# Patient Record
Sex: FEMALE | Race: WHITE | Employment: FULL TIME | URBAN - METROPOLITAN AREA
[De-identification: names, ages, dates, MRNs, and addresses within clinical notes are randomized per-mention and may not be internally consistent; named-entity substitution may affect disease eponyms.]

---

## 2020-10-30 ENCOUNTER — APPOINTMENT (OUTPATIENT)
Dept: GENERAL RADIOLOGY | Age: 65
End: 2020-10-30
Payer: COMMERCIAL

## 2020-10-30 ENCOUNTER — HOSPITAL ENCOUNTER (EMERGENCY)
Age: 65
Discharge: HOME | End: 2020-10-30
Attending: EMERGENCY MEDICINE
Payer: COMMERCIAL

## 2020-10-30 VITALS
OXYGEN SATURATION: 98 % | WEIGHT: 133 LBS | HEIGHT: 59 IN | TEMPERATURE: 98.3 F | DIASTOLIC BLOOD PRESSURE: 81 MMHG | HEART RATE: 83 BPM | RESPIRATION RATE: 16 BRPM | SYSTOLIC BLOOD PRESSURE: 138 MMHG | BODY MASS INDEX: 26.81 KG/M2

## 2020-10-30 PROCEDURE — 93971 EXTREMITY STUDY: CPT

## 2020-10-30 PROCEDURE — 99283 EMERGENCY DEPT VISIT LOW MDM: CPT

## 2020-10-30 PROCEDURE — 73630 X-RAY EXAM OF FOOT: CPT

## 2020-10-30 ASSESSMENT — PAIN SCALES - GENERAL: PAINLEVEL_OUTOF10: 3

## 2020-11-03 ASSESSMENT — ENCOUNTER SYMPTOMS
COLOR CHANGE: 1
COUGH: 0
SHORTNESS OF BREATH: 0

## 2020-11-03 NOTE — ED PROVIDER NOTES
Neurological: Negative for weakness and numbness. PHYSICAL EXAM   (up to 7 for level 4, 8 or more for level 5)      Initial Vitals   ED Triage Vitals [10/30/20 2055]   BP Temp Temp Source Pulse Resp SpO2 Height Weight   138/81 98.3 °F (36.8 °C) Oral 83 16 98 % 4' 11\" (1.499 m) 133 lb (60.3 kg)       Physical Exam  Vitals signs and nursing note reviewed. Constitutional:       General: She is not in acute distress. Appearance: Normal appearance. HENT:      Mouth/Throat:      Comments: Patient is currently wearing a facial mask. Given that patient is not complaining of sore throat or difficulty swallowing, mask was left in place to decrease risk of aersolization of particles in the setting of current CoVID-19 pandemic    Cardiovascular:      Pulses: Normal pulses. Musculoskeletal:      Comments: Patient right foot with ecchymosis lateral portion exposed underneath the lateral malleolus. No ecchymosis in the posterior venous system on the right leg. However right calf slightly enlarged and reddened left calf numbness. No posterior popliteal tenderness. Normal range of motion. Skin:     General: Skin is warm. Capillary Refill: Capillary refill takes less than 2 seconds. Findings: Bruising present. Neurological:      General: No focal deficit present. Mental Status: She is alert and oriented to person, place, and time. Sensory: No sensory deficit. Motor: No weakness. DIFFERENTIAL DIAGNOSIS/IMPRESSION     DDX: MSK injury and DVT. Impression: 60 y.o. female who presents with bruising of the right leg. She has asymptomatic ecchymosis along the direction of the foot that is underneath the lateral malleolus. Patient had over 10 hours from [place]. No injuries to the foot. Normal range of motion. Normal strength sensation. Normal pulses. Concern for DVT given the area of distal asymmetry.   And patient reporting earlier she had noticed a bruise noted on the posterior calf. This is a special concern in the setting of the patient not having any specific trauma. Clinical suspicion for blood clot is high. Will get DVT study and xray. DIAGNOSTIC RESULTS     EKG: All EKG's are interpreted by the Emergency Department Physician who either signs or Co-signs this chart in the absence of a cardiologist.    Not clinically indicated at this time. LABS: Labswere reviewed by me and abnormal results are displayed above     Labs Reviewed - No data to display    RADIOLOGY: All plain film, CT, MRI, and formal ultrasound images (except ED bedside ultrasound) are read by the radiologist, see reports below, unless otherwise noted in ED Course, MDM or here. Xr Foot Right (min 3 Views)    Result Date: 10/30/2020  EXAMINATION: THREE XRAY VIEWS OF THE RIGHT FOOT 10/30/2020 6:02 pm COMPARISON: None. HISTORY: ORDERING SYSTEM PROVIDED HISTORY: foot pain and hematoma TECHNOLOGIST PROVIDED HISTORY: foot pain and hematoma Reason for Exam: Pain and dorsal bruising Acuity: Acute Type of Exam: Initial Additional signs and symptoms: No known injury FINDINGS: There is equivocal periosteal reaction seen along the 2nd metatarsal, especially along its medial aspect. No acute traumatic abnormalities identified. The Lisfranc joint is congruent on these nonweightbearing images. The subtalar joint is unremarkable, with maintenance of the anterior process of the calcaneus. Mild dorsal soft tissue swelling is noted. No soft tissue gas or radiopaque foreign body. Equivocal periosteal reaction involving the 2nd metatarsal, which raise the possibility of a stress response. Dorsal soft tissue swelling. No other acute abnormality detected. If symptoms persist, consider further evaluation with nonemergent MRI.      Vl Lower Extremity Venous Right    Result Date: 10/30/2020    [facility]  Vascular Lower Extremities DVT Study Procedure   Patient Name    [facility]     Date of Study 10/30/2020                  Fritz Hermosillo   Date of Birth   1955   Gender                  Female   Age             60 year(s)   Race                       Room Number     [facility]   Corporate ID #  [id number]   Patient Acct #  [de-identified]   MR #            [id number]       Sonographer             Yovany Garcia   Accession #     [id number]   Interpreting Physician  Enrique Morel   Referring Nurse              Referring Physician     Kady Monsalve MD  Practitioner  Procedure Type of Study:   Veins: Lower Extremities DVT Study, Venous Scan Lower Right. Patient Status:ER. Technical Quality:Adequate visualization. Comments: Indications: Bruising and leg pain in after driving. Patient has a bruise on top of foot. Patient informed me that she was concerned about the bruise on the top of her foot and the pain on the bottom of her foot. Patient said she had no other pain other than the bottom of her foot. Conclusions   Summary   No evidence of superficial or deep venous thrombosis in the right lower  extremity. Signature   ----------------------------------------------------------------  Electronically signed by Yovany Garcia(Sonographer) on  10/30/2020 10:02 PM  ----------------------------------------------------------------   ----------------------------------------------------------------  Electronically signed by Henry Beebe(Interpreting physician)  on 10/30/2020 11:34 PM  ----------------------------------------------------------------  Findings:   Right Impression:                        Left Impression:   The common femoral, femoral, popliteal   The common femoral vein  and tibial veins demonstrate normal      demonstrates normal  compressibility and augmentation. compressibility and augmentation. Normal compressibility of the great  saphenous vein. Normal compressibility of the small  saphenous vein.   Velocities are measured in cm/s ; Diameters are measured in cm Right Lower Extremities DVT Study Measurements Right 2D Measurements +------------------------------------+----------+---------------+----------+ ! Location                            ! Visualized! Compressibility! Thrombosis! +------------------------------------+----------+---------------+----------+ ! Common Femoral                      !Yes       ! Yes            ! None      ! +------------------------------------+----------+---------------+----------+ ! Prox Femoral                        !Yes       ! Yes            ! None      ! +------------------------------------+----------+---------------+----------+ ! Mid Femoral                         !Yes       ! Yes            ! None      ! +------------------------------------+----------+---------------+----------+ ! Dist Femoral                        !Yes       ! Yes            ! None      ! +------------------------------------+----------+---------------+----------+ ! Deep Femoral                        !No        !               !          ! +------------------------------------+----------+---------------+----------+ ! Popliteal                           !Yes       ! Yes            ! None      ! +------------------------------------+----------+---------------+----------+ ! Sapheno Femoral Junction            ! Yes       ! Yes            ! None      ! +------------------------------------+----------+---------------+----------+ ! PTV                                 ! Yes       ! Yes            ! None      ! +------------------------------------+----------+---------------+----------+ ! Peroneal                            !Yes       ! Yes            ! None      ! +------------------------------------+----------+---------------+----------+ ! Gastroc                             ! Yes       ! Yes            ! None      ! +------------------------------------+----------+---------------+----------+ ! GSV Thigh                           ! Yes       ! Yes            ! None      ! +----------------------------+------+------+-------------------------------+ ! Common Femoral              !Phasic!      !                               ! +----------------------------+------+------+-------------------------------+      BEDSIDE ULTRASOUND:  Not clinically indicated at this time. ED COURSE      ED Medication Orders (From admission, onward)    None          EMERGENCYDEPARTMENT COURSE:    DVT study negative  Foot x-ray shows periosteal changes that are on the right foot. The start of a stress fracture. Will place patient in a walking boot, to see symptoms improved. Instructed to follow-up for repeat in his returns back home. Was provided with a disc of her imaging in order to take back to her hometown for comparison. PROCEDURES:  None     CONSULTS:  None    CRITICAL CARE  None     FINAL IMPRESSION      1. Right foot pain          DISPOSITION / PLAN     DISPOSITION Decision To Discharge 10/30/2020 10:27:06 PM      PATIENT REFERRED TO:  Your Primary Care Provider  If you do not have one, please see clinic list below. Schedule an appointment as soon as possible for a visit in 1 week      [facility] ED  [address] [address]  [phone]  Go to   As needed, If symptoms worsen      DISCHARGE MEDICATIONS:  There are no discharge medications for this patient.       Chacorta Ponce MD  Emergency Medicine Attending      (Please note that portions of this note were completed with a voice recognition program.  Efforts were made to edit the dictations but occasionallywords are mis-transcribed.)          Chacorta Ponce MD  11/03/20 5422